# Patient Record
Sex: FEMALE | Race: WHITE | NOT HISPANIC OR LATINO | URBAN - METROPOLITAN AREA
[De-identification: names, ages, dates, MRNs, and addresses within clinical notes are randomized per-mention and may not be internally consistent; named-entity substitution may affect disease eponyms.]

---

## 2019-01-04 ENCOUNTER — IMPORTED ENCOUNTER (OUTPATIENT)
Dept: URBAN - METROPOLITAN AREA CLINIC 31 | Facility: CLINIC | Age: 59
End: 2019-01-04

## 2019-01-04 PROBLEM — H25.013: Noted: 2019-01-04

## 2019-01-04 PROCEDURE — 92004 COMPRE OPH EXAM NEW PT 1/>: CPT

## 2019-01-04 NOTE — PATIENT DISCUSSION
1.  Cataract trace OU: Explained how cataracts can effect vision. Recommend clinical observation. The patient was advised to contact us if any change or worsening of vision. 2. Refractive error - Change glasses. 3.  Return for an appointment in 1 year for comprehensive exam. with Dr. Tyra Pope

## 2020-01-06 ENCOUNTER — IMPORTED ENCOUNTER (OUTPATIENT)
Dept: URBAN - METROPOLITAN AREA CLINIC 31 | Facility: CLINIC | Age: 60
End: 2020-01-06

## 2020-01-06 PROCEDURE — 92015 DETERMINE REFRACTIVE STATE: CPT

## 2020-01-06 PROCEDURE — 92014 COMPRE OPH EXAM EST PT 1/>: CPT

## 2020-01-06 NOTE — PATIENT DISCUSSION
1.  Refractive error - Glasses change optional. 2.  Return for an appointment in 1 year for comprehensive exam. with Dr. Gracy Chapa.

## 2021-02-22 ENCOUNTER — IMPORTED ENCOUNTER (OUTPATIENT)
Dept: URBAN - METROPOLITAN AREA CLINIC 31 | Facility: CLINIC | Age: 61
End: 2021-02-22

## 2021-02-22 PROBLEM — H25.813: Noted: 2021-02-22

## 2021-02-22 PROCEDURE — 92015 DETERMINE REFRACTIVE STATE: CPT

## 2021-02-22 PROCEDURE — 92014 COMPRE OPH EXAM EST PT 1/>: CPT

## 2021-02-22 NOTE — PATIENT DISCUSSION
1. Combined Types of Cataract OU: Explained how cataracts can effect vision. Recommend clinical observation. The patient was advised to contact us if any change or worsening of vision. 2. Refractive error - Change glasses. 3.  Return for an appointment in 1 year for comprehensive exam. with Dr. Jaiime Chase.

## 2022-03-03 ENCOUNTER — IMPORTED ENCOUNTER (OUTPATIENT)
Dept: URBAN - METROPOLITAN AREA CLINIC 31 | Facility: CLINIC | Age: 62
End: 2022-03-03

## 2022-03-03 PROBLEM — H25.813: Noted: 2022-03-03

## 2022-03-03 PROCEDURE — 99214 OFFICE O/P EST MOD 30 MIN: CPT

## 2022-03-03 PROCEDURE — 92015 DETERMINE REFRACTIVE STATE: CPT

## 2022-03-03 NOTE — PATIENT DISCUSSION
1. Combined Types of Cataract OU: Monitor2. Refractive Error: No glasses change. 3. Return for an appointment in 1 year for comprehensive exam. with Dr. Analia Vora.

## 2022-04-02 ASSESSMENT — VISUAL ACUITY
OD_CC: 20/80
OD_CC: J1
OS_SC: 20/30
OD_SC: 20/25-2
OS_CC: 20/60
OS_SC: 20/25-2
OD_PH: CC 20/30
OD_SC: 20/40
OU_CC: 20/25
OS_CC: J1
OU_SC: 20/20-3
OD_SC: 20/40
OS_SC: 20/30

## 2022-04-02 ASSESSMENT — TONOMETRY
OD_IOP_MMHG: 11
OS_IOP_MMHG: 11
OS_IOP_MMHG: 16
OS_IOP_MMHG: 11
OD_IOP_MMHG: 13
OD_IOP_MMHG: 10
OD_IOP_MMHG: 14
OS_IOP_MMHG: 13

## 2022-04-02 ASSESSMENT — PACHYMETRY
OS_CT_UM: 571
OD_CT_UM: 562

## 2023-05-16 ENCOUNTER — COMPREHENSIVE EXAM (OUTPATIENT)
Dept: URBAN - METROPOLITAN AREA CLINIC 29 | Facility: CLINIC | Age: 63
End: 2023-05-16

## 2023-05-16 DIAGNOSIS — H52.13: ICD-10-CM

## 2023-05-16 DIAGNOSIS — H25.813: ICD-10-CM

## 2023-05-16 DIAGNOSIS — H52.203: ICD-10-CM

## 2023-05-16 DIAGNOSIS — H52.4: ICD-10-CM

## 2023-05-16 PROCEDURE — 99214 OFFICE O/P EST MOD 30 MIN: CPT

## 2023-05-16 PROCEDURE — 92015 DETERMINE REFRACTIVE STATE: CPT

## 2023-05-16 ASSESSMENT — TONOMETRY
OS_IOP_MMHG: 10
OD_IOP_MMHG: 1

## 2023-05-16 ASSESSMENT — VISUAL ACUITY
OS_SC: 20/50-1
OS_CC: 20/25
OD_CC: 20/25-2
OD_SC: 20/50-1

## 2024-05-20 ENCOUNTER — COMPREHENSIVE EXAM (OUTPATIENT)
Dept: URBAN - METROPOLITAN AREA CLINIC 29 | Facility: CLINIC | Age: 64
End: 2024-05-20

## 2024-05-20 DIAGNOSIS — H52.13: ICD-10-CM

## 2024-05-20 DIAGNOSIS — H52.223: ICD-10-CM

## 2024-05-20 DIAGNOSIS — H52.4: ICD-10-CM

## 2024-05-20 DIAGNOSIS — H25.813: ICD-10-CM

## 2024-05-20 PROCEDURE — 92015 DETERMINE REFRACTIVE STATE: CPT

## 2024-05-20 PROCEDURE — 92014 COMPRE OPH EXAM EST PT 1/>: CPT

## 2024-05-20 ASSESSMENT — VISUAL ACUITY
OD_BAT: 20/70
OS_CC: 20/25-1
OS_BAT: 20/80
OD_CC: 20/30
OU_CC: 20/20

## 2024-05-20 ASSESSMENT — TONOMETRY
OS_IOP_MMHG: 10
OD_IOP_MMHG: 10

## 2025-04-25 ENCOUNTER — COMPREHENSIVE EXAM (OUTPATIENT)
Age: 65
End: 2025-04-25

## 2025-04-25 DIAGNOSIS — H52.4: ICD-10-CM

## 2025-04-25 DIAGNOSIS — H52.13: ICD-10-CM

## 2025-04-25 DIAGNOSIS — H52.223: ICD-10-CM

## 2025-04-25 DIAGNOSIS — H25.813: ICD-10-CM

## 2025-04-25 PROCEDURE — 99214 OFFICE O/P EST MOD 30 MIN: CPT

## 2025-04-25 PROCEDURE — 92015 DETERMINE REFRACTIVE STATE: CPT
